# Patient Record
Sex: MALE | Race: BLACK OR AFRICAN AMERICAN | Employment: UNEMPLOYED | URBAN - METROPOLITAN AREA
[De-identification: names, ages, dates, MRNs, and addresses within clinical notes are randomized per-mention and may not be internally consistent; named-entity substitution may affect disease eponyms.]

---

## 2018-02-12 ENCOUNTER — OFFICE VISIT (OUTPATIENT)
Dept: FAMILY MEDICINE CLINIC | Age: 63
End: 2018-02-12

## 2018-02-12 ENCOUNTER — HOSPITAL ENCOUNTER (OUTPATIENT)
Dept: LAB | Age: 63
Discharge: HOME | End: 2018-02-12

## 2018-02-12 VITALS
HEART RATE: 78 BPM | WEIGHT: 296 LBS | DIASTOLIC BLOOD PRESSURE: 82 MMHG | BODY MASS INDEX: 42.37 KG/M2 | HEIGHT: 70 IN | SYSTOLIC BLOOD PRESSURE: 145 MMHG | TEMPERATURE: 98 F

## 2018-02-12 DIAGNOSIS — R60.9 EDEMA, UNSPECIFIED TYPE: ICD-10-CM

## 2018-02-12 DIAGNOSIS — R60.9 EDEMA, UNSPECIFIED TYPE: Primary | ICD-10-CM

## 2018-02-12 LAB
ALBUMIN SERPL-MCNC: 3.9 G/DL (ref 3.5–5)
ALBUMIN/GLOB SERPL: 1.1 {RATIO} (ref 1.1–2.2)
ALP SERPL-CCNC: 90 U/L (ref 45–117)
ALT SERPL-CCNC: 27 U/L (ref 12–78)
ANION GAP SERPL CALC-SCNC: 6 MMOL/L (ref 5–15)
AST SERPL-CCNC: 25 U/L (ref 15–37)
BILIRUB SERPL-MCNC: 0.5 MG/DL (ref 0.2–1)
BILIRUB UR QL STRIP: NEGATIVE
BNP SERPL-MCNC: 45 PG/ML (ref 0–125)
BUN SERPL-MCNC: 17 MG/DL (ref 6–20)
BUN/CREAT SERPL: 12 (ref 12–20)
CALCIUM SERPL-MCNC: 8.8 MG/DL (ref 8.5–10.1)
CHLORIDE SERPL-SCNC: 108 MMOL/L (ref 97–108)
CO2 SERPL-SCNC: 28 MMOL/L (ref 21–32)
CREAT SERPL-MCNC: 1.38 MG/DL (ref 0.7–1.3)
GLOBULIN SER CALC-MCNC: 3.6 G/DL (ref 2–4)
GLUCOSE SERPL-MCNC: 82 MG/DL (ref 65–100)
GLUCOSE UR-MCNC: NEGATIVE MG/DL
KETONES P FAST UR STRIP-MCNC: NEGATIVE MG/DL
PH UR STRIP: 6 [PH] (ref 4.6–8)
POTASSIUM SERPL-SCNC: 3.7 MMOL/L (ref 3.5–5.1)
PROT SERPL-MCNC: 7.5 G/DL (ref 6.4–8.2)
PROT UR QL STRIP: NEGATIVE
SODIUM SERPL-SCNC: 142 MMOL/L (ref 136–145)
SP GR UR STRIP: 1.01 (ref 1–1.03)
TSH SERPL DL<=0.05 MIU/L-ACNC: 2.79 UIU/ML (ref 0.36–3.74)
UA UROBILINOGEN AMB POC: NORMAL (ref 0.2–1)
URINALYSIS CLARITY POC: CLEAR
URINALYSIS COLOR POC: YELLOW
URINE BLOOD POC: NEGATIVE
URINE LEUKOCYTES POC: NEGATIVE
URINE NITRITES POC: NEGATIVE

## 2018-02-12 PROCEDURE — 84443 ASSAY THYROID STIM HORMONE: CPT | Performed by: FAMILY MEDICINE

## 2018-02-12 PROCEDURE — 83880 ASSAY OF NATRIURETIC PEPTIDE: CPT | Performed by: FAMILY MEDICINE

## 2018-02-12 PROCEDURE — 80053 COMPREHEN METABOLIC PANEL: CPT | Performed by: FAMILY MEDICINE

## 2018-02-13 NOTE — PROGRESS NOTES
HISTORY OF PRESENT ILLNESS  Ramírez Whelan is a 61 y.o. male. HPI Comments: Has noticed leg edema for the past several months. Cutting down on Na intake hasn't helped. No sscp, sob, CUNHA, orthop, PND, change in urination. Has gained 13 lb since we last saw him 2014. No h/o etoh or tobacco.  Denies pmh of venous problems. On no meds. No fh cvd. Swelling         ROS    Physical Exam   Constitutional: He appears well-developed and well-nourished. No distress. High BMI. Neck: No thyromegaly present. Cardiovascular: Normal rate, regular rhythm and normal heart sounds. Exam reveals no gallop and no friction rub. No murmur heard. DP pulse strong on rigtht, weak on left. Pulmonary/Chest: Breath sounds normal.   Abdominal: Soft. He exhibits no mass. There is no tenderness. Musculoskeletal: He exhibits edema. Brawny, tight 3+edema to just below knees bilat, with venous stasis changes. UA-- negative protein  ekg nl. ASSESSMENT and PLAN   edema appears to be due to venous stasis changes, but r/o renal/liver dx, hypothyroidism, chf.  To use compression stockings and prop legs when sitting. Ok to start slow walking exercise. Mildly elevated bp, recheck 1 month. Exercise may bring it down to nl. Continue the low na+ diet.

## 2018-02-14 NOTE — PROGRESS NOTES
Spoke with pt re stable minor possible renal dysfunction, nl other labs. I think he has venous stasis edema and that weight loss, exercise, avoiding standing and compression hose will help sx, discussed all with him. His bp was mildly elevated, will recheck that at next visit in 1 month. Will refer to dietitian at next visit.

## 2018-03-12 ENCOUNTER — OFFICE VISIT (OUTPATIENT)
Dept: FAMILY MEDICINE CLINIC | Age: 63
End: 2018-03-12

## 2018-03-12 VITALS
DIASTOLIC BLOOD PRESSURE: 74 MMHG | TEMPERATURE: 98 F | WEIGHT: 304 LBS | HEART RATE: 78 BPM | SYSTOLIC BLOOD PRESSURE: 126 MMHG | BODY MASS INDEX: 43.52 KG/M2

## 2018-03-12 DIAGNOSIS — E66.9 OBESITY (BMI 35.0-39.9 WITHOUT COMORBIDITY): ICD-10-CM

## 2018-03-12 DIAGNOSIS — I87.8 VENOUS STASIS: Primary | ICD-10-CM

## 2018-03-12 PROBLEM — E66.01 OBESITY, MORBID (HCC): Status: ACTIVE | Noted: 2018-03-12

## 2018-03-12 NOTE — PROGRESS NOTES
Assessment/Plan:    Diagnoses and all orders for this visit:    1. Venous stasis  Improved - swelling is now about 2 inches more distal then what he had at his last visit. I encouraged him to keep a record on where the swelling is in order to follow his progress     2. Obesity     Pt declines offer for dietician  He states that he knows exactly what to do to get his weight down and has already started by eliminating popcorn and chips. I suggested he try the online weight watchers program and he is not interested. He wants to do this on his own         Follow-up Disposition:  Return in about 3 months (around 6/12/2018). DEANGELO Reed expressed understanding of this plan. An AVS was printed and given to the patient.      ----------------------------------------------------------------------    Chief Complaint   Patient presents with    Follow-up     edema and leg pain        History of Present Illness:  Pt here for recheck on his edema. He is doing well since his last visit. He has made changes to his lifestyle but avoiding chips and popcorn and \"even walking a little now\"  He has noticed an improvement in his swelling of his right leg. He also is happy about his blood pressure. He tells me that a few years [place] He felt that he learned a lot from this fast (he had juices at that time). We talked about sound weight loss principles today and I don't recommend fasts like this for weight loss routinely    Past Medical History:   Diagnosis Date    Blood in stool     bright red -infrequently- intermittently for one year           No Known Allergies    Social History   Substance Use Topics    Smoking status: Never Smoker    Smokeless tobacco: Never Used    Alcohol use No       No family history on file.     Physical Exam:     Visit Vitals    /74 (BP 1 Location: Right arm)    Pulse 78    Temp 98 °F (36.7 °C) (Oral)    Wt 304 lb (137.9 kg)  BMI 43.52 kg/m2     Pleasant, looks well   A&Ox3  WDWN NAD  Respirations normal and non labored  Right LE- swelling about 1/2 way up from ankle to knee, he states that it had been 2/3 up to knee last month. Still red distally, not wearing socks of any sort today   Lab Results   Component Value Date/Time    Sodium 142 02/12/2018 03:43 PM    Potassium 3.7 02/12/2018 03:43 PM    Chloride 108 02/12/2018 03:43 PM    CO2 28 02/12/2018 03:43 PM    Anion gap 6 02/12/2018 03:43 PM    Glucose 82 02/12/2018 03:43 PM    BUN 17 02/12/2018 03:43 PM    Creatinine 1.38 (H) 02/12/2018 03:43 PM    BUN/Creatinine ratio 12 02/12/2018 03:43 PM    GFR est AA >60 02/12/2018 03:43 PM    GFR est non-AA 52 (L) 02/12/2018 03:43 PM    Calcium 8.8 02/12/2018 03:43 PM    Bilirubin, total 0.5 02/12/2018 03:43 PM    AST (SGOT) 25 02/12/2018 03:43 PM    Alk.  phosphatase 90 02/12/2018 03:43 PM    Protein, total 7.5 02/12/2018 03:43 PM    Albumin 3.9 02/12/2018 03:43 PM    Globulin 3.6 02/12/2018 03:43 PM    A-G Ratio 1.1 02/12/2018 03:43 PM    ALT (SGPT) 27 02/12/2018 03:43 PM     Lab Results   Component Value Date/Time    TSH 2.79 02/12/2018 03:43 PM

## 2018-03-12 NOTE — PROGRESS NOTES
Coordination of Care  1. Have you been to the ER, urgent care clinic since your last visit? Hospitalized since your last visit? No    2. Have you seen or consulted any other health care providers outside of the [address] [place] [address] since your last visit? Include any pap smears or colon screening. No    Does the patient need refills? NO    Learning Assessment Complete?  yes